# Patient Record
Sex: FEMALE | URBAN - METROPOLITAN AREA
[De-identification: names, ages, dates, MRNs, and addresses within clinical notes are randomized per-mention and may not be internally consistent; named-entity substitution may affect disease eponyms.]

---

## 2022-06-21 ENCOUNTER — OFFICE VISIT (OUTPATIENT)
Dept: URBAN - METROPOLITAN AREA TELEHEALTH 2 | Facility: TELEHEALTH | Age: 67
End: 2022-06-21
Payer: MEDICARE

## 2022-06-21 DIAGNOSIS — K59.01 SLOW TRANSIT CONSTIPATION: ICD-10-CM

## 2022-06-21 DIAGNOSIS — Z86.010 PERSONAL HISTORY OF COLONIC POLYPS: ICD-10-CM

## 2022-06-21 DIAGNOSIS — K21.9 GERD WITHOUT ESOPHAGITIS: ICD-10-CM

## 2022-06-21 PROCEDURE — 99203 OFFICE O/P NEW LOW 30 MIN: CPT | Performed by: INTERNAL MEDICINE

## 2022-06-21 PROCEDURE — 99213 OFFICE O/P EST LOW 20 MIN: CPT | Performed by: INTERNAL MEDICINE

## 2022-06-21 RX ORDER — PANTOPRAZOLE SODIUM 40 MG/1
1 TABLET INJECTION, POWDER, LYOPHILIZED, FOR SOLUTION INTRAVENOUS
Qty: 30 | Refills: 3 | OUTPATIENT
Start: 2022-06-23

## 2022-06-21 RX ORDER — SODIUM, POTASSIUM,MAG SULFATES 17.5-3.13G
354ML SOLUTION, RECONSTITUTED, ORAL ORAL
Qty: 345 MILLILITER | Refills: 0 | OUTPATIENT
Start: 2022-06-23 | End: 2022-06-24

## 2022-06-21 NOTE — HPI-TODAY'S VISIT:
This is a Telehealth OV to which patient has agreed to. Limitations of telehealth discussed; she understands and agrees to proceed. Patient's @ home during this virtual OV. Patient referred by Cal Serrano MD for evaluation of abdominal pain. Copy of this consult sent to Dr. Serrano.  66 yo pt w few mos of lower abdominal, burning discomfort, unrelated to foods or eating, w concomitant constipation: passage of hard stools w ++ straining @ stools and occasional need to use of laxatives. She reports LBP associted w her lower abdominal discomfort. No melenic stools and no hematochezia. No urologic sxs. Denies anorexia or weight loss. Has intermittent MARY sxs, w coughing, regurgitation and sore burning throat. No dysphagia / odynophagia. Normal CPE 3/22. Has been seen by Urology for bladder prolapse. No constitutional sxs. No COVID-19 exposure and has received 2 doses of COVID-19 vaccine. No other complaints. Currently she's on Augmentin for UTI.

## 2022-06-21 NOTE — PHYSICAL EXAM GASTROINTESTINAL
Medication and Quantity requested: amphetamine-dextroamphetamine (ADDERALL XR) 30 MG extended release capsule          Last Visit  11/19/20    Pharmacy and phone number updated in [facility]:  Yes [facility] Self Exam: Abdomen soft, non-tender to palpatation, non-distended

## 2022-06-22 ENCOUNTER — TELEPHONE ENCOUNTER (OUTPATIENT)
Dept: URBAN - METROPOLITAN AREA CLINIC 98 | Facility: CLINIC | Age: 67
End: 2022-06-22

## 2022-06-23 ENCOUNTER — TELEPHONE ENCOUNTER (OUTPATIENT)
Dept: URBAN - METROPOLITAN AREA CLINIC 98 | Facility: CLINIC | Age: 67
End: 2022-06-23

## 2022-08-04 ENCOUNTER — WEB ENCOUNTER (OUTPATIENT)
Dept: URBAN - METROPOLITAN AREA SURGERY CENTER 15 | Facility: SURGERY CENTER | Age: 67
End: 2022-08-04

## 2022-08-10 ENCOUNTER — OFFICE VISIT (OUTPATIENT)
Dept: URBAN - METROPOLITAN AREA SURGERY CENTER 15 | Facility: SURGERY CENTER | Age: 67
End: 2022-08-10
Payer: MEDICARE

## 2022-08-10 DIAGNOSIS — K59.01 SLOW TRANSIT CONSTIPATION: ICD-10-CM

## 2022-08-10 DIAGNOSIS — Z86.010 ADENOMAS PERSONAL HISTORY OF COLONIC POLYPS: ICD-10-CM

## 2022-08-10 PROCEDURE — G0105 COLORECTAL SCRN; HI RISK IND: HCPCS | Performed by: INTERNAL MEDICINE

## 2022-08-10 PROCEDURE — G8907 PT DOC NO EVENTS ON DISCHARG: HCPCS | Performed by: INTERNAL MEDICINE

## 2022-08-10 RX ORDER — PANTOPRAZOLE SODIUM 40 MG/1
1 TABLET INJECTION, POWDER, LYOPHILIZED, FOR SOLUTION INTRAVENOUS
Qty: 30 | Refills: 3 | Status: ACTIVE | COMMUNITY
Start: 2022-06-23

## 2023-02-07 ENCOUNTER — OFFICE VISIT (OUTPATIENT)
Dept: URBAN - METROPOLITAN AREA CLINIC 78 | Facility: CLINIC | Age: 68
End: 2023-02-07
Payer: MEDICARE

## 2023-02-07 VITALS
TEMPERATURE: 98.1 F | DIASTOLIC BLOOD PRESSURE: 81 MMHG | SYSTOLIC BLOOD PRESSURE: 134 MMHG | HEART RATE: 69 BPM | HEIGHT: 60 IN | BODY MASS INDEX: 25.76 KG/M2 | RESPIRATION RATE: 16 BRPM | WEIGHT: 131.2 LBS

## 2023-02-07 DIAGNOSIS — K21.9 GERD WITHOUT ESOPHAGITIS: ICD-10-CM

## 2023-02-07 DIAGNOSIS — R10.30 LOWER ABDOMINAL PAIN: ICD-10-CM

## 2023-02-07 DIAGNOSIS — R09.89 GLOBUS SENSATION: ICD-10-CM

## 2023-02-07 DIAGNOSIS — K59.01 SLOW TRANSIT CONSTIPATION: ICD-10-CM

## 2023-02-07 DIAGNOSIS — Z86.010 PERSONAL HISTORY OF COLONIC POLYPS: ICD-10-CM

## 2023-02-07 PROBLEM — 428283002: Status: ACTIVE | Noted: 2022-06-23

## 2023-02-07 PROBLEM — 266435005: Status: ACTIVE | Noted: 2022-06-23

## 2023-02-07 PROBLEM — 35298007: Status: ACTIVE | Noted: 2022-06-23

## 2023-02-07 PROCEDURE — 99214 OFFICE O/P EST MOD 30 MIN: CPT | Performed by: INTERNAL MEDICINE

## 2023-02-07 RX ORDER — PANTOPRAZOLE SODIUM 40 MG/1
1 TABLET INJECTION, POWDER, LYOPHILIZED, FOR SOLUTION INTRAVENOUS
Qty: 30 | Refills: 3 | OUTPATIENT

## 2023-02-07 RX ORDER — HYOSCYAMINE SULFATE 0.12 MG/1
1 TABLET UNDER THE TONGUE AND ALLOW TO DISSOLVE TABLET, ORALLY DISINTEGRATING ORAL
Qty: 40 | Refills: 2 | OUTPATIENT
Start: 2023-02-07 | End: 2023-11-04

## 2023-02-07 RX ORDER — PANTOPRAZOLE SODIUM 40 MG/1
1 TABLET INJECTION, POWDER, LYOPHILIZED, FOR SOLUTION INTRAVENOUS
Qty: 30 | Refills: 3 | Status: ON HOLD | COMMUNITY
Start: 2022-06-23

## 2023-02-07 RX ORDER — FEXOFENADINE HYDROCHLORIDE AND PSEUDOEPHEDRINE HYDROCHLORIDE 180; 240 MG/1; MG/1
1 TABLET TABLET, FILM COATED, EXTENDED RELEASE ORAL ONCE A DAY
Qty: 30 | Refills: 1 | OUTPATIENT

## 2023-02-07 RX ORDER — FAMOTIDINE 40 MG/1
1 TABLET AT BEDTIME TABLET, FILM COATED ORAL ONCE A DAY
Status: ACTIVE | COMMUNITY

## 2023-02-07 NOTE — HPI-TODAY'S VISIT:
Patient referred by Cal Serrano MD for evaluation of abdominal pain.  A copy of this note will be sent to the referring physician.  Patient previously seen by LG.  She got COVID in July 2022 and Flu in Nov 2022. She has had persistent throat clearing/globus sensation since the above. She was started on Famotidine QHS. She had tried Pantoprazole previously.   She admits she is under increased stress as her nephew was recently diagnosed with metastatic small bowel cancer.  There is no recent history of rectal bleeding, diarrhea, bloating, rectal pain, anorexia or unintentional weight loss. She has lower abdominal pain/cramps.  She has reported some constipation: passage of hard stools w/ straining. Occasionally needs to use laxatives.   No nausea, vomiting or dysphagia.   The patient does not take blood thinners.  They deny any CP or AVENDANO.  Has been seen by Urology for bladder prolapse.   Patient reports a prior history of colon polyps.  Summary of prior workup: - Colonoscopy by me on Oct /22: Normal terminal ileum, scattered diverticuli in the hepatic flexure and sigmoid colon.  Nonbleeding internal hemorrhoids.  The quality of the prep was good.  Repeat colonoscopy was advised in 5 years. - Colonoscopy in 2019 by ROSITA: Janelle andrews and GRACE's. - Colonoscopy in 2008 by Dr. Barry: Tix. No polyps. Excellent prep.

## 2023-03-28 ENCOUNTER — OFFICE VISIT (OUTPATIENT)
Dept: URBAN - METROPOLITAN AREA CLINIC 78 | Facility: CLINIC | Age: 68
End: 2023-03-28
Payer: MEDICARE

## 2023-03-28 VITALS
BODY MASS INDEX: 26.27 KG/M2 | DIASTOLIC BLOOD PRESSURE: 72 MMHG | SYSTOLIC BLOOD PRESSURE: 124 MMHG | HEIGHT: 60 IN | HEART RATE: 68 BPM | TEMPERATURE: 98.2 F | RESPIRATION RATE: 17 BRPM | WEIGHT: 133.8 LBS

## 2023-03-28 DIAGNOSIS — R10.30 LOWER ABDOMINAL PAIN: ICD-10-CM

## 2023-03-28 DIAGNOSIS — K21.9 GERD WITHOUT ESOPHAGITIS: ICD-10-CM

## 2023-03-28 DIAGNOSIS — Z86.010 PERSONAL HISTORY OF COLONIC POLYPS: ICD-10-CM

## 2023-03-28 DIAGNOSIS — K59.01 SLOW TRANSIT CONSTIPATION: ICD-10-CM

## 2023-03-28 DIAGNOSIS — R09.89 GLOBUS SENSATION: ICD-10-CM

## 2023-03-28 PROCEDURE — 99214 OFFICE O/P EST MOD 30 MIN: CPT | Performed by: INTERNAL MEDICINE

## 2023-03-28 RX ORDER — FAMOTIDINE 40 MG/1
1 TABLET AT BEDTIME TABLET, FILM COATED ORAL ONCE A DAY
Status: ACTIVE | COMMUNITY

## 2023-03-28 RX ORDER — PANTOPRAZOLE SODIUM 40 MG/1
1 TABLET INJECTION, POWDER, LYOPHILIZED, FOR SOLUTION INTRAVENOUS
Qty: 30 | Refills: 3 | Status: ON HOLD | COMMUNITY

## 2023-03-28 RX ORDER — FEXOFENADINE HYDROCHLORIDE AND PSEUDOEPHEDRINE HYDROCHLORIDE 180; 240 MG/1; MG/1
1 TABLET TABLET, FILM COATED, EXTENDED RELEASE ORAL ONCE A DAY
Qty: 30 | Refills: 1 | Status: ACTIVE | COMMUNITY

## 2023-03-28 RX ORDER — HYOSCYAMINE SULFATE 0.12 MG/1
1 TABLET UNDER THE TONGUE AND ALLOW TO DISSOLVE TABLET, ORALLY DISINTEGRATING ORAL
Qty: 40 | Refills: 2 | Status: ON HOLD | COMMUNITY
Start: 2023-02-07 | End: 2023-11-04

## 2023-03-28 NOTE — HPI-TODAY'S VISIT:
Patient referred by Cal Serrano MD for evaluation of abdominal pain.  A copy of this note will be sent to the referring physician.  Patient previously seen by LG.  She got COVID in July 2022 and Flu in Nov 2022. She has had persistent throat clearing/globus sensation since the above. She was started on Famotidine QHS. She had tried Pantoprazole previously. Neitherr helped for her throat clearing/globus sensation. I had then had her try Allegra to help decrease PND. She cannot tell any difference at all. The symptoms persist.  Although Levsin has helped somewhat with the lower abdominal pain, it also persists.  She admits she is under increased stress as her nephew was recently diagnosed with metastatic small bowel cancer.  There is no recent history of rectal bleeding, diarrhea, bloating, rectal pain, anorexia or unintentional weight loss. She has lower abdominal pain/cramps.  She has reported some constipation: passage of hard stools w/ straining. Occasionally needs to use laxatives.   No nausea, vomiting or dysphagia.   The patient does not take blood thinners.  They deny any CP or AVENDANO.  Has been seen by Urology for bladder prolapse.   Patient reports a prior history of colon polyps.  Summary of prior workup: - Colonoscopy by me on Oct /22: Normal terminal ileum, scattered diverticuli in the hepatic flexure and sigmoid colon.  Nonbleeding internal hemorrhoids.  The quality of the prep was good.  Repeat colonoscopy was advised in 5 years. - Colonoscopy in 2019 by : Janelle tix and IH's. - Colonoscopy in 2008 by Dr. Barry: Tix. No polyps. Excellent prep.

## 2023-03-28 NOTE — PHYSICAL EXAM GASTROINTESTINAL
Abdomen , soft, mildly tender in the epigastrium, nondistended , no guarding or rigidity , no masses palpable , normal bowel sounds , Liver and Spleen , no hepatomegaly present , no hepatosplenomegaly , liver nontender , spleen not palpable

## 2023-03-29 ENCOUNTER — OFFICE VISIT (OUTPATIENT)
Dept: URBAN - METROPOLITAN AREA SURGERY CENTER 15 | Facility: SURGERY CENTER | Age: 68
End: 2023-03-29
Payer: MEDICARE

## 2023-03-29 ENCOUNTER — CLAIMS CREATED FROM THE CLAIM WINDOW (OUTPATIENT)
Dept: URBAN - METROPOLITAN AREA CLINIC 4 | Facility: CLINIC | Age: 68
End: 2023-03-29
Payer: MEDICARE

## 2023-03-29 DIAGNOSIS — K29.70 GASTRITIS, UNSPECIFIED, WITHOUT BLEEDING: ICD-10-CM

## 2023-03-29 DIAGNOSIS — K31.89 ACQUIRED DEFORMITY OF DUODENUM: ICD-10-CM

## 2023-03-29 DIAGNOSIS — K31.89 OTHER DISEASES OF STOMACH AND DUODENUM: ICD-10-CM

## 2023-03-29 DIAGNOSIS — K31.7 BENIGN GASTRIC POLYP: ICD-10-CM

## 2023-03-29 DIAGNOSIS — K21.9 ACID REFLUX: ICD-10-CM

## 2023-03-29 PROCEDURE — 88312 SPECIAL STAINS GROUP 1: CPT | Performed by: PATHOLOGY

## 2023-03-29 PROCEDURE — G8907 PT DOC NO EVENTS ON DISCHARG: HCPCS | Performed by: INTERNAL MEDICINE

## 2023-03-29 PROCEDURE — 43239 EGD BIOPSY SINGLE/MULTIPLE: CPT | Performed by: INTERNAL MEDICINE

## 2023-03-29 PROCEDURE — 88305 TISSUE EXAM BY PATHOLOGIST: CPT | Performed by: PATHOLOGY

## 2023-03-29 RX ORDER — FAMOTIDINE 40 MG/1
1 TABLET AT BEDTIME TABLET, FILM COATED ORAL ONCE A DAY
Status: ACTIVE | COMMUNITY

## 2023-03-29 RX ORDER — HYOSCYAMINE SULFATE 0.12 MG/1
1 TABLET UNDER THE TONGUE AND ALLOW TO DISSOLVE TABLET, ORALLY DISINTEGRATING ORAL
Qty: 40 | Refills: 2 | Status: ON HOLD | COMMUNITY
Start: 2023-02-07 | End: 2023-11-04

## 2023-03-29 RX ORDER — PANTOPRAZOLE SODIUM 40 MG/1
1 TABLET INJECTION, POWDER, LYOPHILIZED, FOR SOLUTION INTRAVENOUS
Qty: 30 | Refills: 3 | Status: ON HOLD | COMMUNITY

## 2023-03-29 RX ORDER — FEXOFENADINE HYDROCHLORIDE AND PSEUDOEPHEDRINE HYDROCHLORIDE 180; 240 MG/1; MG/1
1 TABLET TABLET, FILM COATED, EXTENDED RELEASE ORAL ONCE A DAY
Qty: 30 | Refills: 1 | Status: ACTIVE | COMMUNITY

## 2023-04-12 ENCOUNTER — TELEPHONE ENCOUNTER (OUTPATIENT)
Dept: URBAN - METROPOLITAN AREA CLINIC 78 | Facility: CLINIC | Age: 68
End: 2023-04-12

## 2023-04-21 ENCOUNTER — TELEPHONE ENCOUNTER (OUTPATIENT)
Dept: URBAN - METROPOLITAN AREA CLINIC 78 | Facility: CLINIC | Age: 68
End: 2023-04-21

## 2023-05-08 PROBLEM — 129679001: Status: ACTIVE | Noted: 2023-05-08

## 2023-05-09 ENCOUNTER — TELEPHONE ENCOUNTER (OUTPATIENT)
Dept: URBAN - METROPOLITAN AREA CLINIC 78 | Facility: CLINIC | Age: 68
End: 2023-05-09

## 2023-05-31 ENCOUNTER — OFFICE VISIT (OUTPATIENT)
Dept: URBAN - METROPOLITAN AREA SURGERY CENTER 15 | Facility: SURGERY CENTER | Age: 68
End: 2023-05-31
Payer: MEDICARE

## 2023-05-31 DIAGNOSIS — R93.3 ABN FINDINGS-GI TRACT: ICD-10-CM

## 2023-05-31 DIAGNOSIS — K63.89 OTHER SPECIFIED DISEASES OF INTESTINE: ICD-10-CM

## 2023-05-31 PROCEDURE — G8907 PT DOC NO EVENTS ON DISCHARG: HCPCS | Performed by: INTERNAL MEDICINE

## 2023-05-31 PROCEDURE — 45330 DIAGNOSTIC SIGMOIDOSCOPY: CPT | Performed by: INTERNAL MEDICINE

## 2023-05-31 RX ORDER — HYOSCYAMINE SULFATE 0.12 MG/1
1 TABLET UNDER THE TONGUE AND ALLOW TO DISSOLVE TABLET, ORALLY DISINTEGRATING ORAL
Qty: 40 | Refills: 2 | Status: ON HOLD | COMMUNITY
Start: 2023-02-07 | End: 2023-11-04

## 2023-05-31 RX ORDER — PANTOPRAZOLE SODIUM 40 MG/1
1 TABLET INJECTION, POWDER, LYOPHILIZED, FOR SOLUTION INTRAVENOUS
Qty: 30 | Refills: 3 | Status: ON HOLD | COMMUNITY

## 2023-05-31 RX ORDER — FEXOFENADINE HYDROCHLORIDE AND PSEUDOEPHEDRINE HYDROCHLORIDE 180; 240 MG/1; MG/1
1 TABLET TABLET, FILM COATED, EXTENDED RELEASE ORAL ONCE A DAY
Qty: 30 | Refills: 1 | Status: ACTIVE | COMMUNITY

## 2023-05-31 RX ORDER — FAMOTIDINE 40 MG/1
1 TABLET AT BEDTIME TABLET, FILM COATED ORAL ONCE A DAY
Status: ACTIVE | COMMUNITY

## 2023-07-06 ENCOUNTER — OFFICE VISIT (OUTPATIENT)
Dept: URBAN - METROPOLITAN AREA CLINIC 78 | Facility: CLINIC | Age: 68
End: 2023-07-06
Payer: MEDICARE

## 2023-07-06 VITALS
TEMPERATURE: 98.2 F | WEIGHT: 133.8 LBS | DIASTOLIC BLOOD PRESSURE: 77 MMHG | HEIGHT: 60 IN | RESPIRATION RATE: 16 BRPM | BODY MASS INDEX: 26.27 KG/M2 | SYSTOLIC BLOOD PRESSURE: 132 MMHG | HEART RATE: 57 BPM

## 2023-07-06 DIAGNOSIS — K59.01 SLOW TRANSIT CONSTIPATION: ICD-10-CM

## 2023-07-06 DIAGNOSIS — R93.89 ABNORMAL CT SCAN: ICD-10-CM

## 2023-07-06 DIAGNOSIS — Z86.010 PERSONAL HISTORY OF COLONIC POLYPS: ICD-10-CM

## 2023-07-06 DIAGNOSIS — K21.9 GERD WITHOUT ESOPHAGITIS: ICD-10-CM

## 2023-07-06 DIAGNOSIS — D72.819 LEUKOPENIA: ICD-10-CM

## 2023-07-06 DIAGNOSIS — R09.89 GLOBUS SENSATION: ICD-10-CM

## 2023-07-06 DIAGNOSIS — R10.30 LOWER ABDOMINAL PAIN: ICD-10-CM

## 2023-07-06 DIAGNOSIS — E80.4 GILBERT SYNDROME: ICD-10-CM

## 2023-07-06 PROCEDURE — 99214 OFFICE O/P EST MOD 30 MIN: CPT | Performed by: INTERNAL MEDICINE

## 2023-07-06 RX ORDER — PANTOPRAZOLE SODIUM 40 MG/1
1 TABLET INJECTION, POWDER, LYOPHILIZED, FOR SOLUTION INTRAVENOUS
Qty: 30 | Refills: 3 | Status: ON HOLD | COMMUNITY

## 2023-07-06 RX ORDER — HYOSCYAMINE SULFATE 0.12 MG/1
1 TABLET UNDER THE TONGUE AND ALLOW TO DISSOLVE TABLET, ORALLY DISINTEGRATING ORAL
Qty: 40 | Refills: 2 | Status: ON HOLD | COMMUNITY
Start: 2023-02-07 | End: 2023-11-04

## 2023-07-06 RX ORDER — FEXOFENADINE HYDROCHLORIDE AND PSEUDOEPHEDRINE HYDROCHLORIDE 180; 240 MG/1; MG/1
1 TABLET TABLET, FILM COATED, EXTENDED RELEASE ORAL ONCE A DAY
Qty: 30 | Refills: 1 | Status: ON HOLD | COMMUNITY

## 2023-07-06 RX ORDER — FAMOTIDINE 40 MG/1
1 TABLET AT BEDTIME TABLET, FILM COATED ORAL ONCE A DAY
Status: ON HOLD | COMMUNITY

## 2023-07-06 NOTE — HPI-TODAY'S VISIT:
Patient referred by Cal Serrano MD for evaluation of abdominal pain.  A copy of this note will be sent to the referring physician.   She got COVID in July 2022 and Flu in Nov 2022. She has had persistent throat clearing/globus sensation since the above. She was started on Famotidine QHS. She had tried Pantoprazole previously. Neither helped for her throat clearing/globus sensation. I had then had her try Allegra to help decrease PND. She cannot tell any difference at all. The symptoms persist.  She was most recently seen by ENT who thought her globus sensation was due to LPR. He recommended she take Prilosec 20mg daily. She has been doing so for the past 2 weeks and still cannot tell any difference.  He recommended she get a Sinus CT scan, which is already scheduled for the latter part of July.   Although Levsin has helped somewhat with the lower abdominal pain, it still persists. At my recommendation she ws evaluated by the surgeon. She is having hernia repair by Dr. Quinones on July 10th.   She admits she is under increased stress as her nephew was recently diagnosed with metastatic small bowel cancer.  There is no recent history of rectal bleeding, diarrhea, bloating, rectal pain, anorexia or unintentional weight loss. She has lower abdominal pain/cramps.  She has reported some constipation: passage of hard stools w/ straining. Occasionally needs to use laxatives.   No nausea, vomiting or dysphagia.   The patient does not take blood thinners.  They deny any CP or AVENDANO.  Has been seen by Urology for bladder prolapse.   Patient reports a prior history of colon polyps.  Summary of prior workup: - Labs on 6/29/23: Na 139, K 3.7, Cl 106, CO2 21, Gluc 80, BUN 12, Cr 0.7, T Bili 1.3, AST/ALT 20s, . WBC 3.9, Hb 13.8, Plts 195. - FS by me on 5/31/23: Normal limited colonoscopy to the transverse colon. No mass. No obstruction.  - CT Abd/Pelvis/5/23: No focal lesion involving the liver, spleen, pancreas, adrenal glands or left kidney.  In the right kidney there is 3 mm cyst.  No aneurysm.  No obvious gallstones.  Unfortunately there was no oral contrast in the lateral portion of the colon hence they could not evaluate that area well.  They did mention a short segment narrowing in the proximal sigmoid colon possibly from spasm or tumor.  They recommended a colonoscopy.  No urinary obstruction. - E/C by me in March 2023: Normal esophagus; proximal esophageal biopsies were unremarkable while distal esophageal biopsies were consistent with mild reflux changes.  Regular Z-line, 2 cm hiatal hernia.  Few sessile fundic gland polyps were noted in the fundus. Antral erythema; no H pylori.  No retained food in the stomach.  A few diffuse erosions were noted in the bulb.  Normal second and third portion normal ampulla. No celiac sprue.  - Colonoscopy by me on Oct /22: Normal terminal ileum, scattered diverticuli in the hepatic flexure and sigmoid colon.  Nonbleeding internal hemorrhoids.  The quality of the prep was good.  Repeat colonoscopy was advised in 5 years. - Colonoscopy in 2019 by LG: Janelle andrews and GRACE's. - Colonoscopy in 2008 by Dr. Barry: Christian. No polyps. Excellent prep.

## 2023-09-12 ENCOUNTER — OFFICE VISIT (OUTPATIENT)
Dept: URBAN - METROPOLITAN AREA CLINIC 78 | Facility: CLINIC | Age: 68
End: 2023-09-12
Payer: MEDICARE

## 2023-09-12 VITALS
RESPIRATION RATE: 15 BRPM | HEIGHT: 60 IN | HEART RATE: 64 BPM | DIASTOLIC BLOOD PRESSURE: 85 MMHG | TEMPERATURE: 98.2 F | WEIGHT: 131.8 LBS | SYSTOLIC BLOOD PRESSURE: 144 MMHG | BODY MASS INDEX: 25.87 KG/M2

## 2023-09-12 DIAGNOSIS — R09.89 GLOBUS SENSATION: ICD-10-CM

## 2023-09-12 DIAGNOSIS — R10.30 LOWER ABDOMINAL PAIN: ICD-10-CM

## 2023-09-12 DIAGNOSIS — E80.4 GILBERT SYNDROME: ICD-10-CM

## 2023-09-12 DIAGNOSIS — K59.01 SLOW TRANSIT CONSTIPATION: ICD-10-CM

## 2023-09-12 DIAGNOSIS — K21.9 GERD WITHOUT ESOPHAGITIS: ICD-10-CM

## 2023-09-12 DIAGNOSIS — D72.819 LEUKOPENIA: ICD-10-CM

## 2023-09-12 DIAGNOSIS — R93.89 ABNORMAL CT SCAN: ICD-10-CM

## 2023-09-12 DIAGNOSIS — Z86.010 PERSONAL HISTORY OF COLONIC POLYPS: ICD-10-CM

## 2023-09-12 PROCEDURE — 99214 OFFICE O/P EST MOD 30 MIN: CPT | Performed by: INTERNAL MEDICINE

## 2023-09-12 RX ORDER — FEXOFENADINE HYDROCHLORIDE AND PSEUDOEPHEDRINE HYDROCHLORIDE 180; 240 MG/1; MG/1
1 TABLET TABLET, FILM COATED, EXTENDED RELEASE ORAL ONCE A DAY
Qty: 30 | Refills: 1 | Status: ON HOLD | COMMUNITY

## 2023-09-12 RX ORDER — HYOSCYAMINE SULFATE 0.12 MG/1
1 TABLET UNDER THE TONGUE AND ALLOW TO DISSOLVE TABLET, ORALLY DISINTEGRATING ORAL
Qty: 40 | Refills: 2 | Status: ON HOLD | COMMUNITY
Start: 2023-02-07 | End: 2023-11-04

## 2023-09-12 RX ORDER — PANTOPRAZOLE SODIUM 40 MG/1
1 TABLET INJECTION, POWDER, LYOPHILIZED, FOR SOLUTION INTRAVENOUS
Qty: 30 | Refills: 3 | Status: ON HOLD | COMMUNITY

## 2023-09-12 RX ORDER — SUCRALFATE 1 G/1
1 TABLET ON AN EMPTY STOMACH TABLET ORAL
Qty: 60 TABLET | Refills: 1 | OUTPATIENT
Start: 2023-09-12 | End: 2023-11-10

## 2023-09-12 RX ORDER — FAMOTIDINE 40 MG/1
1 TABLET AT BEDTIME TABLET, FILM COATED ORAL ONCE A DAY
Status: ON HOLD | COMMUNITY

## 2023-09-12 RX ORDER — OMEPRAZOLE 40 MG/1
1 CAPSULE 30 MINUTES BEFORE MORNING MEAL AND 30 MINUTES BEFORE DINNER CAPSULE, DELAYED RELEASE ORAL TWICE DAILY
Qty: 60 | Refills: 3

## 2023-09-12 NOTE — HPI-TODAY'S VISIT:
Patient referred by Cal Serrano MD for evaluation of abdominal pain.  A copy of this note will be sent to the referring physician.   She got COVID in July 2022 and Flu in Nov 2022. She has had persistent throat clearing/globus sensation since the above. She was started on Famotidine QHS. She had tried Pantoprazole previously. Neither helped for her throat clearing/globus sensation. I had then had her try Allegra to help decrease PND. She cannot tell any difference at all. The symptoms persist.  She was most recently seen by ENT who thought her globus sensation was due to LPR. He recommended she take Prilosec 20mg daily. She has been doing so for the past 2 weeks and still cannot tell any difference.   She went to see ENT in July for ongoing throat phlegm. He even ordered CT scan of the sinuses which was negative for any sinusitis. The ENT told her this was definitely due to GERD.   Although Levsin has helped somewhat with the lower abdominal pain, it still persists. She underwent hernia repair by Dr. Quinones on July 10th.  She has had some pain in the lower abdomen. Surgical site has not had any discharge or redness. No fevers or chills. She has been using Ibuprofen prn with good resultls.  She admits she is under increased stress as her nephew was diagnosed with metastatic small bowel cancer. He has been receibving chemo for 1 year and is not doing any better.  There is no recent history of rectal bleeding, diarrhea, bloating, rectal pain, anorexia or unintentional weight loss. She has lower abdominal pain/cramps.  She has reported some constipation: passage of hard stools w/ straining. Occasionally needs to use laxatives.   No nausea, vomiting or dysphagia.   The patient does not take blood thinners.  They deny any CP or AVENDANO.  Has been seen by Urology for bladder prolapse.   Patient reports a prior history of colon polyps.  Summary of prior workup: - Labs in Sept 2023: T Chol 258, .  - Labs on 6/29/23: Na 139, K 3.7, Cl 106, CO2 21, Gluc 80, BUN 12, Cr 0.7, T Bili 1.3, AST/ALT 20s, . WBC 3.9, Hb 13.8, Plts 195. - FS by me on 5/31/23: Normal limited colonoscopy to the transverse colon. No mass. No obstruction.  - CT Abd/Pelvis/5/23: No focal lesion involving the liver, spleen, pancreas, adrenal glands or left kidney.  In the right kidney there is 3 mm cyst.  No aneurysm.  No obvious gallstones.  Unfortunately there was no oral contrast in the lateral portion of the colon hence they could not evaluate that area well.  They did mention a short segment narrowing in the proximal sigmoid colon possibly from spasm or tumor.  They recommended a colonoscopy.  No urinary obstruction. - E/C by me in March 2023: Normal esophagus; proximal esophageal biopsies were unremarkable while distal esophageal biopsies were consistent with mild reflux changes.  Regular Z-line, 2 cm hiatal hernia.  Few sessile fundic gland polyps were noted in the fundus. Antral erythema; no H pylori.  No retained food in the stomach.  A few diffuse erosions were noted in the bulb.  Normal second and third portion normal ampulla. No celiac sprue.  - Colonoscopy by me on Oct /22: Normal terminal ileum, scattered diverticuli in the hepatic flexure and sigmoid colon.  Nonbleeding internal hemorrhoids.  The quality of the prep was good.  Repeat colonoscopy was advised in 5 years. - Colonoscopy in 2019 by ROSITA: Janelle andrews and GRACE's. - Colonoscopy in 2008 by Dr. Barry: Tix. No polyps. Excellent prep.

## 2023-09-18 ENCOUNTER — TELEPHONE ENCOUNTER (OUTPATIENT)
Dept: URBAN - METROPOLITAN AREA CLINIC 78 | Facility: CLINIC | Age: 68
End: 2023-09-18

## 2023-11-09 ENCOUNTER — DASHBOARD ENCOUNTERS (OUTPATIENT)
Age: 68
End: 2023-11-09

## 2023-11-14 ENCOUNTER — OFFICE VISIT (OUTPATIENT)
Dept: URBAN - METROPOLITAN AREA CLINIC 78 | Facility: CLINIC | Age: 68
End: 2023-11-14
Payer: MEDICARE

## 2023-11-14 VITALS
BODY MASS INDEX: 26.31 KG/M2 | WEIGHT: 134 LBS | DIASTOLIC BLOOD PRESSURE: 76 MMHG | HEART RATE: 65 BPM | TEMPERATURE: 98.2 F | SYSTOLIC BLOOD PRESSURE: 125 MMHG | RESPIRATION RATE: 16 BRPM | HEIGHT: 60 IN

## 2023-11-14 DIAGNOSIS — R10.30 LOWER ABDOMINAL PAIN: ICD-10-CM

## 2023-11-14 DIAGNOSIS — E80.4 GILBERT SYNDROME: ICD-10-CM

## 2023-11-14 DIAGNOSIS — K76.0 HEPATIC STEATOSIS: ICD-10-CM

## 2023-11-14 DIAGNOSIS — K21.9 GERD WITHOUT ESOPHAGITIS: ICD-10-CM

## 2023-11-14 DIAGNOSIS — D72.819 LEUKOPENIA: ICD-10-CM

## 2023-11-14 DIAGNOSIS — R09.89 GLOBUS SENSATION: ICD-10-CM

## 2023-11-14 DIAGNOSIS — Z86.010 PERSONAL HISTORY OF COLONIC POLYPS: ICD-10-CM

## 2023-11-14 DIAGNOSIS — R93.89 ABNORMAL CT SCAN: ICD-10-CM

## 2023-11-14 DIAGNOSIS — K59.01 SLOW TRANSIT CONSTIPATION: ICD-10-CM

## 2023-11-14 PROBLEM — 197321007: Status: ACTIVE | Noted: 2023-11-14

## 2023-11-14 PROCEDURE — 99214 OFFICE O/P EST MOD 30 MIN: CPT | Performed by: INTERNAL MEDICINE

## 2023-11-14 RX ORDER — OMEPRAZOLE 40 MG/1
1 CAPSULE 30 MINUTES BEFORE MORNING MEAL AND 30 MINUTES BEFORE DINNER CAPSULE, DELAYED RELEASE ORAL TWICE DAILY
Qty: 60 | Refills: 3 | Status: ON HOLD | COMMUNITY

## 2023-11-14 RX ORDER — FAMOTIDINE 40 MG/1
1 TABLET AT BEDTIME TABLET, FILM COATED ORAL ONCE A DAY
COMMUNITY

## 2023-11-14 RX ORDER — FEXOFENADINE HYDROCHLORIDE AND PSEUDOEPHEDRINE HYDROCHLORIDE 180; 240 MG/1; MG/1
1 TABLET TABLET, FILM COATED, EXTENDED RELEASE ORAL ONCE A DAY
Qty: 30 | Refills: 1 | COMMUNITY

## 2023-11-14 RX ORDER — PANTOPRAZOLE SODIUM 40 MG/1
1 TABLET INJECTION, POWDER, LYOPHILIZED, FOR SOLUTION INTRAVENOUS
Qty: 30 | Refills: 3 | COMMUNITY

## 2023-11-14 NOTE — HPI-TODAY'S VISIT:
Patient referred by Cal Serrano MD for evaluation of abdominal pain.  A copy of this note will be sent to the referring physician.   She got COVID in July 2022 and Flu in Nov 2022. She has had persistent throat clearing/globus sensation since the above. She was started on Famotidine QHS. She had tried Pantoprazole previously. Neither helped for her throat clearing/globus sensation. I had then had her try Allegra to help decrease PND. She cannot tell any difference at all. The symptoms persist.  She was most recently seen by ENT who thought her globus sensation was due to LPR. He recommended she take Prilosec 20mg daily. She has been doing so for the past 2 weeks and still cannot tell any difference.   She went to see ENT in July for ongoing throat phlegm. He even ordered CT scan of the sinuses which was negative for any sinusitis. The ENT told her this was definitely due to GERD.   Although Levsin has helped somewhat with the lower abdominal pain, it still persists. She underwent hernia repair by Dr. Quinones on July 10th.  She has been using Ibuprofen prn with good resultls.  She admits she is under increased stress as her nephew was diagnosed with metastatic small bowel cancer. He has been receiving chemo for 1 year and is not doing any better. They are thinking about hospice soon.   There is no recent history of rectal bleeding, diarrhea, bloating, rectal pain, anorexia or unintentional weight loss. She has lower abdominal pain/cramps.  She has reported some constipation: passage of hard stools w/ straining. Occasionally needs to use laxatives.   No nausea, vomiting or dysphagia.   She stopped both the Omeprazole and Sucrafate   The patient does not take blood thinners.  They deny any CP or AVENDANO.  Has been seen by Urology for bladder prolapse.   She was seen by Dr. Iron Clark for leukopenia. She was diagnosed with benign ethnic leukopenia and a conservative approacj was recommended.  Patient reports a prior history of colon polyps.  Summary of prior workup: - CT A/P on 11/3/23: Unremarkable. - Abd US on 10/19/23: Hepatic steatosis, CBD 4mm. No gallstones. - CXR on 10/17/23: No acute cardiac or pulmonary abnormality. - Labs on 10/3/23: Normal CBC except for WBC of 4.1, Folate 11.6, B12 >2000, Copper 122, Zinc 102, TSH 0.9,  - Labs in Sept 2023: T Chol 258, .  - Labs on 6/29/23: Na 139, K 3.7, Cl 106, CO2 21, Gluc 80, BUN 12, Cr 0.7, T Bili 1.3, AST/ALT 20s, . WBC 3.9, Hb 13.8, Plts 195. - FS by me on 5/31/23: Normal limited colonoscopy to the transverse colon. No mass. No obstruction.  - CT Abd/Pelvis/5/23: No focal lesion involving the liver, spleen, pancreas, adrenal glands or left kidney.  In the right kidney there is 3 mm cyst.  No aneurysm.  No obvious gallstones.  Unfortunately there was no oral contrast in the lateral portion of the colon hence they could not evaluate that area well.  They did mention a short segment narrowing in the proximal sigmoid colon possibly from spasm or tumor.  They recommended a colonoscopy.  No urinary obstruction. - E/C by me in March 2023: Normal esophagus; proximal esophageal biopsies were unremarkable while distal esophageal biopsies were consistent with mild reflux changes.  Regular Z-line, 2 cm hiatal hernia.  Few sessile fundic gland polyps were noted in the fundus. Antral erythema; no H pylori.  No retained food in the stomach.  A few diffuse erosions were noted in the bulb.  Normal second and third portion normal ampulla. No celiac sprue.  - Colonoscopy by me on Oct /22: Normal terminal ileum, scattered diverticuli in the hepatic flexure and sigmoid colon.  Nonbleeding internal hemorrhoids.  The quality of the prep was good.  Repeat colonoscopy was advised in 5 years. - Colonoscopy in 2019 by LG: Janelle tix and IH's. - Colonoscopy in 2008 by Dr. Barry: Tix. No polyps. Excellent prep.